# Patient Record
Sex: MALE | Race: WHITE
[De-identification: names, ages, dates, MRNs, and addresses within clinical notes are randomized per-mention and may not be internally consistent; named-entity substitution may affect disease eponyms.]

---

## 2020-05-20 ENCOUNTER — HOSPITAL ENCOUNTER (EMERGENCY)
Dept: HOSPITAL 12 - ER | Age: 45
Discharge: HOME | End: 2020-05-20
Payer: MEDICAID

## 2020-05-20 VITALS — BODY MASS INDEX: 29.03 KG/M2 | HEIGHT: 67 IN | WEIGHT: 185 LBS

## 2020-05-20 DIAGNOSIS — M10.9: Primary | ICD-10-CM

## 2020-05-20 PROCEDURE — A4663 DIALYSIS BLOOD PRESSURE CUFF: HCPCS

## 2020-05-20 NOTE — NUR
Patient discharged to home in stable condition.  Written and verbal after care 
instructions given. 

Patient verbalizes understanding of instructions. pt walks in steay 
gait.Stressed follow up or return to ER for worsening s/s.

## 2021-09-28 ENCOUNTER — HOSPITAL ENCOUNTER (EMERGENCY)
Dept: HOSPITAL 12 - ER | Age: 46
Discharge: HOME | End: 2021-09-28
Payer: MEDICAID

## 2021-09-28 VITALS — BODY MASS INDEX: 32.14 KG/M2 | WEIGHT: 200 LBS | HEIGHT: 66 IN
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
## 2021-09-28 DIAGNOSIS — M70.822: Primary | ICD-10-CM

## 2021-09-28 DIAGNOSIS — Z86.39: ICD-10-CM

## 2021-09-28 PROCEDURE — 99284 EMERGENCY DEPT VISIT MOD MDM: CPT

## 2021-09-28 PROCEDURE — 73080 X-RAY EXAM OF ELBOW: CPT

## 2021-09-28 PROCEDURE — 82962 GLUCOSE BLOOD TEST: CPT

## 2021-09-28 PROCEDURE — 96372 THER/PROPH/DIAG INJ SC/IM: CPT

## 2021-09-28 PROCEDURE — A4663 DIALYSIS BLOOD PRESSURE CUFF: HCPCS

## 2021-10-09 ENCOUNTER — HOSPITAL ENCOUNTER (EMERGENCY)
Dept: HOSPITAL 12 - ER | Age: 46
Discharge: HOME | End: 2021-10-09
Payer: COMMERCIAL

## 2021-10-09 VITALS — SYSTOLIC BLOOD PRESSURE: 130 MMHG | DIASTOLIC BLOOD PRESSURE: 87 MMHG

## 2021-10-09 VITALS — WEIGHT: 200 LBS | HEIGHT: 66 IN | BODY MASS INDEX: 32.14 KG/M2

## 2021-10-09 DIAGNOSIS — Z86.39: ICD-10-CM

## 2021-10-09 DIAGNOSIS — M70.22: Primary | ICD-10-CM

## 2021-10-09 PROCEDURE — A4663 DIALYSIS BLOOD PRESSURE CUFF: HCPCS
